# Patient Record
Sex: FEMALE | Race: WHITE | NOT HISPANIC OR LATINO | URBAN - METROPOLITAN AREA
[De-identification: names, ages, dates, MRNs, and addresses within clinical notes are randomized per-mention and may not be internally consistent; named-entity substitution may affect disease eponyms.]

---

## 2024-03-27 ENCOUNTER — OV NP (OUTPATIENT)
Dept: URBAN - METROPOLITAN AREA CLINIC 78 | Facility: CLINIC | Age: 79
End: 2024-03-27

## 2024-04-01 ENCOUNTER — OV NP (OUTPATIENT)
Dept: URBAN - METROPOLITAN AREA CLINIC 78 | Facility: CLINIC | Age: 79
End: 2024-04-01
Payer: COMMERCIAL

## 2024-04-01 ENCOUNTER — LAB (OUTPATIENT)
Dept: URBAN - METROPOLITAN AREA CLINIC 78 | Facility: CLINIC | Age: 79
End: 2024-04-01

## 2024-04-01 VITALS
SYSTOLIC BLOOD PRESSURE: 108 MMHG | HEIGHT: 62 IN | RESPIRATION RATE: 14 BRPM | DIASTOLIC BLOOD PRESSURE: 68 MMHG | BODY MASS INDEX: 16.67 KG/M2 | WEIGHT: 90.6 LBS | HEART RATE: 111 BPM | TEMPERATURE: 98.1 F

## 2024-04-01 DIAGNOSIS — K59.09 CHRONIC CONSTIPATION: ICD-10-CM

## 2024-04-01 DIAGNOSIS — Q43.8 TORTUOUS COLON: ICD-10-CM

## 2024-04-01 DIAGNOSIS — Z90.710 HISTORY OF HYSTERECTOMY: ICD-10-CM

## 2024-04-01 DIAGNOSIS — K56.609 SBO (SMALL BOWEL OBSTRUCTION): ICD-10-CM

## 2024-04-01 DIAGNOSIS — Z09 HOSPITAL DISCHARGE FOLLOW-UP: ICD-10-CM

## 2024-04-01 PROBLEM — 161800001: Status: ACTIVE | Noted: 2024-04-01

## 2024-04-01 PROBLEM — 10331000132107: Status: ACTIVE | Noted: 2024-04-01

## 2024-04-01 PROCEDURE — 99204 OFFICE O/P NEW MOD 45 MIN: CPT | Performed by: INTERNAL MEDICINE

## 2024-04-01 NOTE — PHYSICAL EXAM PERIPHERAL PULSES:
2+ radial - 2+ dorsalis pedis Carac Counseling:  I discussed with the patient the risks of Carac including but not limited to erythema, scaling, itching, weeping, crusting, and pain.

## 2024-04-01 NOTE — HPI-TODAY'S VISIT:
Patient was referred by Dr. Debi Candelario  A copy of this document will be sent to the physician.     Patient is an 78-year-old pleasant female seen in the recent hospital visit where she was admitted on March 19, 2024 with a closed-loop small bowel obstruction confirmed on CT scan she was evaluated by Dr. Guerrero and it appears the bowel obstruction resolved spontaneously.  She had to return to the hospital for bilateral pedal edema.  One of the KUBs was also suggestive of constipation on the right side.  Her last colonoscopy was performed in 2016 by Dr. Dania herzog which revealed no polyps but confirmed tortuous colon.  Since getting discharged she has had no nausea or vomiting.  Baseline bowel pattern is once every 4 days except for today where she had 2 BMs.  Patient has been to the emergency room on 3/12/2024 for abdominal pain no diarrhea CT scan was suggestive of wall thickening and inflammation involving the left hemicolon.  She was discharged on Augmentin Zofran and some pain medications labs at that time were essentially normal especially kidney functions liver enzymes and lipase magnesium level was 2.1    CT scan of abdomen pelvis 3/19/2024 shows finding consistent with small bowel obstruction with a transition point in the mid line lower abdomen where there is a loop of small bowel with no adjacent transition point suggesting a close loop obstruction or internal hernia small volume ascites no wall thickening or pneumatosis surgical consult was recommended  CT scan abdomen pelvis with IV contrast dated 3/24/2024Reveals several loops of prominent small bowel measuring 3 mm in diameter air-fluid levels no transition point moderate colonic fecal burden in the right colon distal colon is decompressed patient is status post hysterectomy

## 2024-05-04 ENCOUNTER — WEB ENCOUNTER (OUTPATIENT)
Dept: URBAN - METROPOLITAN AREA CLINIC 78 | Facility: CLINIC | Age: 79
End: 2024-05-04

## 2024-05-13 ENCOUNTER — DASHBOARD ENCOUNTERS (OUTPATIENT)
Age: 79
End: 2024-05-13

## 2024-05-13 ENCOUNTER — WEB ENCOUNTER (OUTPATIENT)
Dept: URBAN - METROPOLITAN AREA CLINIC 78 | Facility: CLINIC | Age: 79
End: 2024-05-13

## 2024-05-13 ENCOUNTER — OFFICE VISIT (OUTPATIENT)
Dept: URBAN - METROPOLITAN AREA CLINIC 78 | Facility: CLINIC | Age: 79
End: 2024-05-13
Payer: COMMERCIAL

## 2024-05-13 VITALS
HEIGHT: 62 IN | TEMPERATURE: 98.1 F | BODY MASS INDEX: 15.6 KG/M2 | DIASTOLIC BLOOD PRESSURE: 67 MMHG | WEIGHT: 84.8 LBS | SYSTOLIC BLOOD PRESSURE: 123 MMHG | HEART RATE: 91 BPM | RESPIRATION RATE: 14 BRPM

## 2024-05-13 DIAGNOSIS — Q43.8 TORTUOUS COLON: ICD-10-CM

## 2024-05-13 DIAGNOSIS — Z90.710 HISTORY OF HYSTERECTOMY: ICD-10-CM

## 2024-05-13 DIAGNOSIS — K56.609 SBO (SMALL BOWEL OBSTRUCTION): ICD-10-CM

## 2024-05-13 DIAGNOSIS — R63.4 WEIGHT LOSS: ICD-10-CM

## 2024-05-13 DIAGNOSIS — K59.09 CHRONIC CONSTIPATION: ICD-10-CM

## 2024-05-13 PROCEDURE — 99213 OFFICE O/P EST LOW 20 MIN: CPT | Performed by: INTERNAL MEDICINE

## 2024-05-13 RX ORDER — LINACLOTIDE 72 UG/1
1 CAPSULE AT LEAST 30 MINUTES BEFORE THE FIRST MEAL OF THE DAY ON AN EMPTY STOMACH CAPSULE, GELATIN COATED ORAL ONCE A DAY
Qty: 90 | Refills: 0 | Status: ACTIVE | COMMUNITY
Start: 2024-04-09 | End: 2024-07-08

## 2024-05-30 ENCOUNTER — WEB ENCOUNTER (OUTPATIENT)
Dept: URBAN - METROPOLITAN AREA CLINIC 78 | Facility: CLINIC | Age: 79
End: 2024-05-30

## 2024-06-04 ENCOUNTER — OFFICE VISIT (OUTPATIENT)
Dept: URBAN - METROPOLITAN AREA CLINIC 12 | Facility: CLINIC | Age: 79
End: 2024-06-04
Payer: COMMERCIAL

## 2024-06-04 VITALS
HEART RATE: 62 BPM | SYSTOLIC BLOOD PRESSURE: 119 MMHG | BODY MASS INDEX: 15.35 KG/M2 | WEIGHT: 83.4 LBS | TEMPERATURE: 97.1 F | HEIGHT: 62 IN | DIASTOLIC BLOOD PRESSURE: 61 MMHG

## 2024-06-04 DIAGNOSIS — R11.0 NAUSEA: ICD-10-CM

## 2024-06-04 DIAGNOSIS — Q43.8 TORTUOUS COLON: ICD-10-CM

## 2024-06-04 DIAGNOSIS — K59.09 CHRONIC CONSTIPATION: ICD-10-CM

## 2024-06-04 DIAGNOSIS — K56.609 SBO (SMALL BOWEL OBSTRUCTION): ICD-10-CM

## 2024-06-04 PROCEDURE — 99214 OFFICE O/P EST MOD 30 MIN: CPT | Performed by: INTERNAL MEDICINE

## 2024-06-04 RX ORDER — OMEPRAZOLE 40 MG/1
1 CAPSULE 30 MINUTES BEFORE MORNING MEAL CAPSULE, DELAYED RELEASE ORAL ONCE A DAY
Qty: 90 | Refills: 1 | OUTPATIENT
Start: 2024-06-04

## 2024-06-04 RX ORDER — ATORVASTATIN CALCIUM 20 MG/1
1 TABLET TABLET, FILM COATED ORAL ONCE A DAY
Status: ACTIVE | COMMUNITY

## 2024-06-04 RX ORDER — LEVOTHYROXINE SODIUM 50 UG/1
1 CAPSULE IN THE MORNING ON AN EMPTY STOMACH CAPSULE ORAL ONCE A DAY
Status: ACTIVE | COMMUNITY

## 2024-06-04 RX ORDER — LINACLOTIDE 72 UG/1
1 CAPSULE AT LEAST 30 MINUTES BEFORE THE FIRST MEAL OF THE DAY ON AN EMPTY STOMACH CAPSULE, GELATIN COATED ORAL ONCE A DAY
Qty: 90 | Refills: 0 | Status: ACTIVE | COMMUNITY
Start: 2024-04-09 | End: 2024-07-08

## 2024-06-04 NOTE — HPI-TODAY'S VISIT:
79F h/o SBO which resolved spontaneously in 3/2024 MR enterography 2024--no SB mass lesions [id number]--[id number] colon Chr constipation . Today she c/o constipation. She is on linzess 72 but that doesnt help her BM almost everyday but she has to strain a lot and only a few pellets come out Also c/o nausea for which she takes ondanseteron prn She also has ?s abt diet after SBO

## 2024-06-24 ENCOUNTER — WEB ENCOUNTER (OUTPATIENT)
Dept: URBAN - METROPOLITAN AREA CLINIC 12 | Facility: CLINIC | Age: 79
End: 2024-06-24

## 2024-07-03 ENCOUNTER — OFFICE VISIT (OUTPATIENT)
Dept: URBAN - METROPOLITAN AREA MEDICAL CENTER 10 | Facility: MEDICAL CENTER | Age: 79
End: 2024-07-03

## 2024-07-05 ENCOUNTER — OFFICE VISIT (OUTPATIENT)
Dept: URBAN - METROPOLITAN AREA CLINIC 12 | Facility: CLINIC | Age: 79
End: 2024-07-05
Payer: COMMERCIAL

## 2024-07-05 ENCOUNTER — WEB ENCOUNTER (OUTPATIENT)
Dept: URBAN - METROPOLITAN AREA CLINIC 12 | Facility: CLINIC | Age: 79
End: 2024-07-05

## 2024-07-05 VITALS
BODY MASS INDEX: 15.27 KG/M2 | SYSTOLIC BLOOD PRESSURE: 132 MMHG | DIASTOLIC BLOOD PRESSURE: 76 MMHG | HEIGHT: 62 IN | WEIGHT: 83 LBS | HEART RATE: 68 BPM | TEMPERATURE: 97.2 F

## 2024-07-05 DIAGNOSIS — R19.5 POSITIVE FECAL OCCULT BLOOD TEST: ICD-10-CM

## 2024-07-05 DIAGNOSIS — Q43.8 TORTUOUS COLON: ICD-10-CM

## 2024-07-05 PROCEDURE — 99213 OFFICE O/P EST LOW 20 MIN: CPT

## 2024-07-05 RX ORDER — OMEPRAZOLE 40 MG/1
1 CAPSULE 30 MINUTES BEFORE MORNING MEAL CAPSULE, DELAYED RELEASE ORAL ONCE A DAY
Qty: 90 | Refills: 1 | Status: ACTIVE | COMMUNITY
Start: 2024-06-04

## 2024-07-05 RX ORDER — ATORVASTATIN CALCIUM 20 MG/1
1 TABLET TABLET, FILM COATED ORAL ONCE A DAY
Status: ACTIVE | COMMUNITY

## 2024-07-05 RX ORDER — LINACLOTIDE 72 UG/1
1 CAPSULE AT LEAST 30 MINUTES BEFORE THE FIRST MEAL OF THE DAY ON AN EMPTY STOMACH CAPSULE, GELATIN COATED ORAL ONCE A DAY
Qty: 90 | Refills: 0 | Status: ACTIVE | COMMUNITY
Start: 2024-04-09 | End: 2024-07-08

## 2024-07-05 RX ORDER — LEVOTHYROXINE SODIUM 50 UG/1
1 CAPSULE IN THE MORNING ON AN EMPTY STOMACH CAPSULE ORAL ONCE A DAY
Status: ACTIVE | COMMUNITY

## 2024-07-05 NOTE — HPI-TODAY'S VISIT:
Pt is a 80 yo female with hx of small bowel obstruction, tortuous colon and constipation presents today for positve fecal occult test. She saw her PCP for regular check up and was recommended fecal occult test given her hx of tortuous colon and it came back positive for blood in the stool. Denies overt rectal bleeding. Last colon was done in 2016 by Dr. Valle revealing non-bleeding IH and tortuous colon. Denies hx of colon polyps or any abnormal findings from previous colonoscopies. Denies FH of CRC.

## 2024-07-09 ENCOUNTER — TELEPHONE ENCOUNTER (OUTPATIENT)
Dept: URBAN - METROPOLITAN AREA CLINIC 12 | Facility: CLINIC | Age: 79
End: 2024-07-09

## 2024-07-12 ENCOUNTER — WEB ENCOUNTER (OUTPATIENT)
Dept: URBAN - METROPOLITAN AREA CLINIC 12 | Facility: CLINIC | Age: 79
End: 2024-07-12

## 2024-07-12 PROBLEM — 236069009: Status: ACTIVE | Noted: 2024-07-12

## 2024-08-27 ENCOUNTER — OFFICE VISIT (OUTPATIENT)
Dept: URBAN - METROPOLITAN AREA CLINIC 12 | Facility: CLINIC | Age: 79
End: 2024-08-27

## 2024-08-27 VITALS
HEART RATE: 73 BPM | DIASTOLIC BLOOD PRESSURE: 68 MMHG | TEMPERATURE: 97.9 F | BODY MASS INDEX: 15.27 KG/M2 | SYSTOLIC BLOOD PRESSURE: 138 MMHG | WEIGHT: 83 LBS | HEIGHT: 62 IN

## 2024-08-27 RX ORDER — OMEPRAZOLE 40 MG/1
1 CAPSULE 30 MINUTES BEFORE MORNING MEAL CAPSULE, DELAYED RELEASE ORAL ONCE A DAY
Qty: 90 | Refills: 1 | Status: ACTIVE | COMMUNITY
Start: 2024-06-04

## 2024-08-27 RX ORDER — ATORVASTATIN CALCIUM 20 MG/1
1 TABLET TABLET, FILM COATED ORAL ONCE A DAY
Status: ACTIVE | COMMUNITY

## 2024-08-27 RX ORDER — LEVOTHYROXINE SODIUM 50 UG/1
1 CAPSULE IN THE MORNING ON AN EMPTY STOMACH CAPSULE ORAL ONCE A DAY
Status: ACTIVE | COMMUNITY

## 2024-08-27 NOTE — HPI-TODAY'S VISIT:
79F h/o SBO which resolved spontaneously in 3/2024 MR enterography 2024--no SB mass lesions [id number]--[id number] colon Chr constipation . she had a positive fecal globin test and she was seen in our office for that. she was told to get a Cologuard test. she did that and is here for f/u Cologuard came back negative she feels fine. uses laxatives and prune juice for chr constipation. BM everyday. no blood. no change in stool caliber

## 2024-12-02 ENCOUNTER — TELEPHONE ENCOUNTER (OUTPATIENT)
Dept: URBAN - METROPOLITAN AREA CLINIC 6 | Facility: CLINIC | Age: 79
End: 2024-12-02

## 2024-12-02 RX ORDER — OMEPRAZOLE 40 MG/1
1 CAPSULE 30 MINUTES BEFORE MORNING MEAL CAPSULE, DELAYED RELEASE ORAL ONCE A DAY
Qty: 90 | Refills: 1
Start: 2024-06-04

## 2025-01-30 NOTE — PHYSICAL EXAM PSYCH:
Pt called back and states the [place] states they have not received forms. Previously faxed to forms completion on 1/17/25- received confirmation they went through.    normal mood with appropriate affect, speech clear, thought process logical, goal directed